# Patient Record
Sex: FEMALE | Race: WHITE | Employment: UNEMPLOYED | URBAN - METROPOLITAN AREA
[De-identification: names, ages, dates, MRNs, and addresses within clinical notes are randomized per-mention and may not be internally consistent; named-entity substitution may affect disease eponyms.]

---

## 2018-04-16 ENCOUNTER — HOSPITAL ENCOUNTER (OUTPATIENT)
Dept: MAMMOGRAPHY | Age: 48
Discharge: HOME | End: 2018-04-16
Attending: OBSTETRICS & GYNECOLOGY | Admitting: OBSTETRICS & GYNECOLOGY

## 2018-04-16 DIAGNOSIS — Z12.31 VISIT FOR SCREENING MAMMOGRAM: ICD-10-CM

## 2018-10-30 ENCOUNTER — HOSPITAL ENCOUNTER (OUTPATIENT)
Dept: PHYSICAL THERAPY | Age: 48
Setting detail: THERAPIES SERIES
Discharge: HOME | End: 2018-10-30
Payer: COMMERCIAL

## 2018-10-30 PROCEDURE — 97140 MANUAL THERAPY 1/> REGIONS: CPT

## 2018-10-30 PROCEDURE — G8982 BODY POS GOAL STATUS: HCPCS

## 2018-10-30 PROCEDURE — G8981 BODY POS CURRENT STATUS: HCPCS

## 2018-10-30 ASSESSMENT — PAIN SCALES - GENERAL: PAINLEVEL_OUTOF10: 2

## 2018-10-31 ENCOUNTER — HOSPITAL ENCOUNTER (OUTPATIENT)
Dept: PHYSICAL THERAPY | Age: 48
Setting detail: THERAPIES SERIES
Discharge: HOME | End: 2018-10-31
Payer: COMMERCIAL

## 2018-10-31 PROCEDURE — 97110 THERAPEUTIC EXERCISES: CPT

## 2018-10-31 PROCEDURE — 97140 MANUAL THERAPY 1/> REGIONS: CPT

## 2018-11-05 ENCOUNTER — HOSPITAL ENCOUNTER (OUTPATIENT)
Dept: PHYSICAL THERAPY | Age: 48
Setting detail: THERAPIES SERIES
Discharge: HOME | End: 2018-11-05
Payer: COMMERCIAL

## 2018-11-05 PROCEDURE — 97140 MANUAL THERAPY 1/> REGIONS: CPT

## 2018-11-05 PROCEDURE — 97110 THERAPEUTIC EXERCISES: CPT

## 2018-11-05 NOTE — FLOWSHEET NOTE
Physical Therapy Daily Treatment Note    Date:  2018    Patient Name:  Shlomo Angeles    :  1970  MRN: [id number]  Restrictions/Precautions:    Medical/Treatment Diagnosis Information:  · Diagnosis: LBP   Insurance/Certification information:  PT Insurance Information: [employer]  Physician Information:  Referring Practitioner: Chris  Plan of care signed (Y/N):  Y  Visit# / total visits:  3/16    G-Code (if applicable):         PT G-Codes  Functional Assessment Tool Used: Modified Oswestry  Score: 22% disability  Functional Limitation: Changing and maintaining body position  Changing and Maintaining Body Position Current Status (): At least 20 percent but less than 40 percent impaired, limited or restricted  Changing and Maintaining Body Position Goal Status (): At least 1 percent but less than 20 percent impaired, limited or restricted    Time in:   2:30     Timed Treatment: 45 Total Treatment Time:  45  ________________________________________________________________________________________    Pain Level:    0/10  SUBJECTIVE:  Pt reports increased mid back pain from sleeping on a different mattress. LBP and hip pain has improved. OBJECTIVE:     Exercise/Equipment Resistance/Repetitions Other comments          TA sets         With march       With KFO Until achieved with BP cuff  x10  x10 HEP video    Clamshells 10x5 secs B HEP video   Bridging 10x5 secs  HEP video          Prone hip extension   x5 mins  HEP video    Prone gluteal sets x2 mins  HEP video           Lumbo pelvic stabilization   x2 mins    Posterior sling supine x2 mins B    Anterior sling supine x2 mins B                  Posture re-ed   x6 mins            Hip neuro re-ed B IR/ER   x8 mins            Dowel wilber training   NV              Other Therapeutic Activities:  Movement training x5 mins    Manual Treatments:   Gr 5 LAD and SAD L hip. MWM L hip ER. Thoracic distraction gr 5 with cavitation.   Gr 4 lumbar

## 2018-11-07 ENCOUNTER — HOSPITAL ENCOUNTER (OUTPATIENT)
Dept: PHYSICAL THERAPY | Age: 48
Setting detail: THERAPIES SERIES
Discharge: HOME | End: 2018-11-07
Payer: COMMERCIAL

## 2018-11-07 PROCEDURE — 97110 THERAPEUTIC EXERCISES: CPT

## 2018-11-07 PROCEDURE — 97140 MANUAL THERAPY 1/> REGIONS: CPT

## 2018-11-07 NOTE — FLOWSHEET NOTE
Physical Therapy Daily Treatment Note    Date:  2018    Patient Name:  Som Siddiqi    :  1970  MRN: [id number]  Restrictions/Precautions:    Medical/Treatment Diagnosis Information:  · Diagnosis: LBP   Insurance/Certification information:  PT Insurance Information: [employer]  Physician Information:  Referring Practitioner: Chris  Plan of care signed (Y/N):  Y  Visit# / total visits:      G-Code (if applicable):         PT G-Codes  Functional Assessment Tool Used: Modified Oswestry  Score: 22% disability  Functional Limitation: Changing and maintaining body position  Changing and Maintaining Body Position Current Status (): At least 20 percent but less than 40 percent impaired, limited or restricted  Changing and Maintaining Body Position Goal Status (): At least 1 percent but less than 20 percent impaired, limited or restricted    Time in:   9:30     Timed Treatment: 45 Total Treatment Time:  45  ________________________________________________________________________________________    Pain Level:    0/10  SUBJECTIVE:  Pt reports feeling 'good today'. Pt did a lot of standing yesterday and was conscious of her posture. OBJECTIVE:     Exercise/Equipment Resistance/Repetitions Other comments          TA sets         With march       With KFO x2 mins  HEP video    Clamshells 10x5 secs B HEP video   Bridging 6x5 secs  HEP video          Prone hip extension    HEP video    Prone gluteal sets  HEP video           Lumbo pelvic stabilization   x2 mins    Posterior sling supine     Anterior sling supine     All 4 sit backs with dowel wilber  x15    Sidelying thoracic rotation   x12 B     Posture re-ed   x6 mins     Quadruped multifidus   x10 B     Hip neuro re-ed B IR/ER   x5 mins            Dowel wilber training   NV     TG   x6 mins end of session      Other Therapeutic Activities:  Movement training x5 mins    Manual Treatments:   Gr 5 LAD and SAD L hip. MWM L hip ER.   Thoracic distraction gr 5 with cavitation. Gr 4 lumbar gapping L L3-4.  elliott gr 5 L SI correction followed by prone SI correction gr 5 with cavitation. B LE pulling. Seated lumbar FMP raking and STM. L lumbar needing and manual stretching. Modalities:      Test/Measurements:    Hypomobility L SI and L hip joint   Increased tension L lumbar/SI with L rotation, SB and extension       ASSESSMENT:  Pt responding well to manual therapy with improved B hip and SI mobility. Movement impairment training to begin after normal biomechanics restored in L hip and core stabilization initiated.          Treatment/Activity Tolerance:   [x] Patient tolerated treatment well [] Patient limited by fatique  [] Patient limited by pain [] Patient limited by other medical complications  [] Other:     Goals:          Long term goals  Time Frame for Long term goals : 8 weeks  Long term goal 1: Pt independent wtih HEP  Long term goal 2: Pt hip strength to improve by 2/3 muscle grade grossly  Long term goal 3: Pt amb wtih normalized gait and absent ERS  Long term goal 4: Normal L SI joint mobility  Long term goal 5: Return to PLOF     Plan: [x] Continue per plan of care [] Alter current plan (see comments)   [] Plan of care initiated [] Hold pending MD visit [] Discharge      Plan for Next Session:      Re-Certification Due Date:         Signature:  Dell Amanda PT

## 2018-11-13 ENCOUNTER — HOSPITAL ENCOUNTER (OUTPATIENT)
Dept: PHYSICAL THERAPY | Age: 48
Setting detail: THERAPIES SERIES
Discharge: HOME | End: 2018-11-13
Payer: COMMERCIAL

## 2018-11-13 PROCEDURE — 97110 THERAPEUTIC EXERCISES: CPT

## 2018-11-13 PROCEDURE — 97140 MANUAL THERAPY 1/> REGIONS: CPT

## 2018-11-15 ENCOUNTER — HOSPITAL ENCOUNTER (OUTPATIENT)
Dept: PHYSICAL THERAPY | Age: 48
Setting detail: THERAPIES SERIES
Discharge: HOME | End: 2018-11-15
Payer: COMMERCIAL

## 2018-11-15 PROCEDURE — 97140 MANUAL THERAPY 1/> REGIONS: CPT

## 2018-11-15 NOTE — FLOWSHEET NOTE
Physical Therapy Daily Treatment Note    Date:  11/15/2018    Patient Name:  Anastasia Grey    :  1970  MRN: [id number]  Restrictions/Precautions:    Medical/Treatment Diagnosis Information:  · Diagnosis: LBP   Insurance/Certification information:  PT Insurance Information: [employer]  Physician Information:  Referring Practitioner: Chris  Plan of care signed (Y/N):  Y  Visit# / total visits:      G-Code (if applicable):         PT G-Codes  Functional Assessment Tool Used: Modified Oswestry  Score: 22% disability  Functional Limitation: Changing and maintaining body position  Changing and Maintaining Body Position Current Status (): At least 20 percent but less than 40 percent impaired, limited or restricted  Changing and Maintaining Body Position Goal Status (): At least 1 percent but less than 20 percent impaired, limited or restricted    Time in:   10:30    Timed Treatment: 30 Total Treatment Time:  30  ________________________________________________________________________________________    Pain Level:    0/10  SUBJECTIVE:    Pt reports feeling good after last session with decreased R SI pain and restrictions. Pt was able to do pilates with asymetrical work for her glutes.        OBJECTIVE:     Exercise/Equipment Resistance/Repetitions Other comments          TA sets         With march       With KFO  HEP video    Clamshells  HEP video   Bridging  HEP video          Prone hip extension    HEP video    Prone gluteal sets  HEP video    Stacking with MB       Lumbo pelvic stabilization sidelying  x4 mins    Posterior sling supine     Anterior sling supine     All 4 sit backs with dowel wilber      Sidelying thoracic rotation        Posture re-ed       Standing multifidus       Standing posterior sling     Hip neuro re-ed B IR/ER       Sidelying pelvic PNF   x10 with mod resist    Dowel wilber training   NV     TG         Other Therapeutic Activities:  Movement training x5 mins    Manual Treatments:   Gr 5 LAD and SAD B hip. MWM L hip ER. Thoracic distraction gr 5 with cavitation. Labette gr 5 R SI correction followed by prone SI correction gr 5 with cavitation. B LE pulling. Seated lumbar FMP raking and STM. Lumbar gapping mobs gr 3-4. L lumbar needing and manual stretching. Lumbar needing and STM R QL followed by CR stretching. SI drop technique without cavitation. Modalities:      Test/Measurements:    Full AROM lumbar spine but tightness and ache in mid LB at K8-6 region with certain movements  Hypomobility R AI and R hip joint   Hypomobility L2-4  Increased tension L lumbar/SI with L rotation, SB and extension  SB L with tension on the R       ASSESSMENT:  Pt responding well to manual therapy with improved B hip and SI mobility but again presented with R SI endrange lock. Pt states she was doing asymmetrical gluteal work with pilates yesterday so this training may have stressed the R SI. Movement impairment training to begin after normal biomechanics restored in L hip and core stabilization initiated.          Treatment/Activity Tolerance:   [x] Patient tolerated treatment well [] Patient limited by fatique  [] Patient limited by pain [] Patient limited by other medical complications  [] Other:     Goals:          Long term goals  Time Frame for Long term goals : 8 weeks  Long term goal 1: Pt independent wtih HEP  Long term goal 2: Pt hip strength to improve by 2/3 muscle grade grossly  Long term goal 3: Pt amb wtih normalized gait and absent ERS  Long term goal 4: Normal L SI joint mobility  Long term goal 5: Return to PLOF     Plan: [x] Continue per plan of care [] Alter current plan (see comments)   [] Plan of care initiated [] Hold pending MD visit [] Discharge      Plan for Next Session:      Re-Certification Due Date:         Signature:  Dimitri Sarmiento, PT

## 2018-11-19 ENCOUNTER — HOSPITAL ENCOUNTER (OUTPATIENT)
Dept: PHYSICAL THERAPY | Age: 48
Setting detail: THERAPIES SERIES
Discharge: HOME | End: 2018-11-19
Payer: COMMERCIAL

## 2018-11-19 PROCEDURE — 97140 MANUAL THERAPY 1/> REGIONS: CPT

## 2018-11-19 PROCEDURE — 97110 THERAPEUTIC EXERCISES: CPT

## 2018-11-19 NOTE — FLOWSHEET NOTE
Physical Therapy Daily Treatment Note    Date:  2018    Patient Name:  Germania Owens    :  1970  MRN: [id number]  Restrictions/Precautions:    Medical/Treatment Diagnosis Information:  · Diagnosis: LBP   Insurance/Certification information:  PT Insurance Information: [employer]iselda  Physician Information:  Referring Practitioner: Chris  Plan of care signed (Y/N):  Y  Visit# / total visits:      G-Code (if applicable):         PT G-Codes  Functional Assessment Tool Used: Modified Oswestry  Score: 22% disability  Functional Limitation: Changing and maintaining body position  Changing and Maintaining Body Position Current Status (): At least 20 percent but less than 40 percent impaired, limited or restricted  Changing and Maintaining Body Position Goal Status (): At least 1 percent but less than 20 percent impaired, limited or restricted    Time in:   2:30    Timed Treatment: 40 Total Treatment Time:  40  ________________________________________________________________________________________    Pain Level:    0/10  SUBJECTIVE:   Pt reports feeling good since last session.         OBJECTIVE:     Exercise/Equipment Resistance/Repetitions Other comments   SLS training   x6 mins B     TA sets         With march       With KFO  HEP video    Clamshells  HEP video   Magnetic click       Prone  To fatigue  To fatigue HEP video    Planks  x10 mins total all 3 HEP video   Quadruped superman   x10 with dowel wilber  HEP video    Prone hip extension    HEP video    Prone gluteal sets  HEP video    Stacking with MB       Lumbo pelvic stabilization sidelying  x4 mins    Posterior sling supine     Anterior sling supine     All 4 sit backs with dowel wilber      Sidelying thoracic rotation        Posture re-ed       Standing multifidus   x15 B maroon TB    Standing posterior sling x15 B 8\" step     Hip neuro re-ed B IR/ER       Sidelying pelvic PNF   x10 with mod resist    Dowel wilber training   NV     TG

## 2018-11-21 ENCOUNTER — HOSPITAL ENCOUNTER (OUTPATIENT)
Dept: PHYSICAL THERAPY | Age: 48
Setting detail: THERAPIES SERIES
Discharge: HOME | End: 2018-11-21
Payer: COMMERCIAL

## 2018-11-21 PROCEDURE — 97140 MANUAL THERAPY 1/> REGIONS: CPT

## 2018-11-27 ENCOUNTER — HOSPITAL ENCOUNTER (OUTPATIENT)
Dept: PHYSICAL THERAPY | Age: 48
Setting detail: THERAPIES SERIES
Discharge: HOME | End: 2018-11-27
Payer: COMMERCIAL

## 2018-11-29 ENCOUNTER — APPOINTMENT (OUTPATIENT)
Dept: PHYSICAL THERAPY | Age: 48
End: 2018-11-29
Payer: COMMERCIAL

## 2018-12-03 ENCOUNTER — HOSPITAL ENCOUNTER (OUTPATIENT)
Dept: PHYSICAL THERAPY | Age: 48
Setting detail: THERAPIES SERIES
Discharge: HOME | End: 2018-12-03
Payer: COMMERCIAL

## 2018-12-03 PROCEDURE — 97140 MANUAL THERAPY 1/> REGIONS: CPT

## 2018-12-06 ENCOUNTER — HOSPITAL ENCOUNTER (OUTPATIENT)
Dept: PHYSICAL THERAPY | Age: 48
Setting detail: THERAPIES SERIES
Discharge: HOME | End: 2018-12-06
Payer: COMMERCIAL

## 2018-12-06 PROCEDURE — 97110 THERAPEUTIC EXERCISES: CPT

## 2018-12-06 PROCEDURE — 97140 MANUAL THERAPY 1/> REGIONS: CPT

## 2018-12-06 NOTE — FLOWSHEET NOTE
Physical Therapy Daily Treatment Note    Date:  2018    Patient Name:  Mable Freeman    :  1970  MRN: [id number]  Restrictions/Precautions:    Medical/Treatment Diagnosis Information:  · Diagnosis: LBP   Insurance/Certification information:  PT Insurance Information: Griselda  Physician Information:  Referring Practitioner: Chris  Plan of care signed (Y/N):  Y  Visit# / total visits:  10/16    G-Code (if applicable):         PT G-Codes  Functional Assessment Tool Used: Modified Oswestry  Score: 22% disability  Functional Limitation: Changing and maintaining body position  Changing and Maintaining Body Position Current Status (): At least 20 percent but less than 40 percent impaired, limited or restricted  Changing and Maintaining Body Position Goal Status (): At least 1 percent but less than 20 percent impaired, limited or restricted    Time in:   10:00    Timed Treatment: 50 Total Treatment Time:  50  ________________________________________________________________________________________    Pain Level:    0/10  SUBJECTIVE:   Pt reports feeling good since last session. Pt notes the 'QL' is definitely the cause.            OBJECTIVE:     Exercise/Equipment Resistance/Repetitions Other comments   SLS training       TA sets         With march       With KFO 10x5 secs  HEP video    Bridging 10x5 secs  HEP video    Clamshells  HEP video   Magnetic click       Prone   HEP video    Planks   HEP video   Quadruped superman   x15 B HEP video    Prone hip extension    HEP video    Prone gluteal sets  HEP video    Stacking with MB       Lumbo pelvic stabilization supine x2 mins    Posterior sling supine x2 mins    Anterior sling supine     All 4 sit backs with dowel wilber  x15    Sidelying lumbo pelvic stab x3 mins total L    Posture re-ed       Standing multifidus       Standing posterior sling     Hip neuro re-ed IR/ER       Sidelying pelvic PNF   x10 with mod resist    Dowel wilber training Doorway squats x8 mins  x4 mins     TG   x6 mins with MHP end of session      Other Therapeutic Activities:  Movement training x5 mins    Manual Treatments:   Gr 5 LAD and SAD L hip. MWM L hip ER. L gr 5 PI correction with cavitation. Prone lumbar rocking mobs gr 3-4. Seated lumbar FMP raking and STM. L lumbar needing and manual stretching. Lumbar needing and STM L QL followed by CR stretching. Skin rolling L lateral lumbar followed by manual cross hand stretching. Modalities:      Test/Measurements:    Full AROM lumbar spine but tightness and ache in mid LB at U5-2 region with certain movements  L side lumbar with L rotation   R side lumbar with L SB  Hypomobility L PI   Hypermobility lower lumbar  L QL tenderness       ASSESSMENT:  Significant tension noted in L QL contributing to restricted movement on L side LB. Responded well to manual therapy with 0/10 pain by end of session. Initiated movement training with dowel wilber.           Treatment/Activity Tolerance:   [x] Patient tolerated treatment well [] Patient limited by fatique  [] Patient limited by pain [] Patient limited by other medical complications  [] Other:     Goals:          Long term goals  Time Frame for Long term goals : 8 weeks  Long term goal 1: Pt independent wtih HEP  Long term goal 2: Pt hip strength to improve by 2/3 muscle grade grossly  Long term goal 3: Pt amb wtih normalized gait and absent ERS  Long term goal 4: Normal L SI joint mobility  Long term goal 5: Return to PLOF     Plan: [x] Continue per plan of care [] Alter current plan (see comments)   [] Plan of care initiated [] Hold pending MD visit [] Discharge      Plan for Next Session:      Re-Certification Due Date:         Signature:  Keyla Conner, PT

## 2018-12-10 ENCOUNTER — HOSPITAL ENCOUNTER (OUTPATIENT)
Dept: PHYSICAL THERAPY | Age: 48
Setting detail: THERAPIES SERIES
Discharge: HOME | End: 2018-12-10
Payer: COMMERCIAL

## 2018-12-10 PROCEDURE — 97110 THERAPEUTIC EXERCISES: CPT

## 2018-12-10 PROCEDURE — 97140 MANUAL THERAPY 1/> REGIONS: CPT

## 2018-12-12 ENCOUNTER — HOSPITAL ENCOUNTER (OUTPATIENT)
Dept: PHYSICAL THERAPY | Age: 48
Setting detail: THERAPIES SERIES
Discharge: HOME | End: 2018-12-12
Payer: COMMERCIAL

## 2018-12-12 PROCEDURE — 97140 MANUAL THERAPY 1/> REGIONS: CPT

## 2018-12-12 PROCEDURE — 97110 THERAPEUTIC EXERCISES: CPT

## 2018-12-12 NOTE — FLOWSHEET NOTE
Standing posterior sling     Hip neuro re-ed IR/ER       Sidelying pelvic PNF   x10 with mod resist    Dowel wilber training         Doorway squats   x4 mins     TG   x6 mins with MHP end of session      Other Therapeutic Activities:  Movement training x5 mins    Manual Treatments:     L lumbar needing and manual stretching. Lumbar needing and STM L QL followed by CR stretching. Modalities:      Test/Measurements:      L QL tenderness  L thoracic rotation restriction       ASSESSMENT:  Significant tension noted in L QL contributing to restricted movement on L side LB. Responded well to manual therapy with 0/10 pain by end of session.             Treatment/Activity Tolerance:   [x] Patient tolerated treatment well [] Patient limited by fatique  [] Patient limited by pain [] Patient limited by other medical complications  [] Other:     Goals:          Long term goals  Time Frame for Long term goals : 8 weeks  Long term goal 1: Pt independent wtih HEP  Long term goal 2: Pt hip strength to improve by 2/3 muscle grade grossly  Long term goal 3: Pt amb wtih normalized gait and absent ERS  Long term goal 4: Normal L SI joint mobility  Long term goal 5: Return to PLOF     Plan: [x] Continue per plan of care [] Alter current plan (see comments)   [] Plan of care initiated [] Hold pending MD visit [] Discharge      Plan for Next Session:      Re-Certification Due Date:         Signature:  Pastor Diaz, PT

## 2018-12-19 ENCOUNTER — APPOINTMENT (OUTPATIENT)
Dept: PHYSICAL THERAPY | Age: 48
End: 2018-12-19
Payer: COMMERCIAL

## 2018-12-21 ENCOUNTER — HOSPITAL ENCOUNTER (OUTPATIENT)
Dept: PHYSICAL THERAPY | Age: 48
Setting detail: THERAPIES SERIES
Discharge: HOME | End: 2018-12-21
Payer: COMMERCIAL

## 2018-12-21 PROCEDURE — 97140 MANUAL THERAPY 1/> REGIONS: CPT

## 2018-12-21 PROCEDURE — 97110 THERAPEUTIC EXERCISES: CPT

## 2018-12-24 ENCOUNTER — APPOINTMENT (OUTPATIENT)
Dept: PHYSICAL THERAPY | Age: 48
End: 2018-12-24
Payer: COMMERCIAL

## 2019-01-03 ENCOUNTER — HOSPITAL ENCOUNTER (OUTPATIENT)
Dept: PHYSICAL THERAPY | Age: 49
Setting detail: THERAPIES SERIES
Discharge: HOME | End: 2019-01-03
Payer: COMMERCIAL

## 2019-01-03 PROCEDURE — 97530 THERAPEUTIC ACTIVITIES: CPT

## 2019-01-07 ENCOUNTER — APPOINTMENT (OUTPATIENT)
Dept: PHYSICAL THERAPY | Age: 49
End: 2019-01-07
Payer: COMMERCIAL

## 2019-01-10 ENCOUNTER — APPOINTMENT (OUTPATIENT)
Dept: PHYSICAL THERAPY | Age: 49
End: 2019-01-10
Payer: COMMERCIAL

## 2019-01-24 ENCOUNTER — APPOINTMENT (OUTPATIENT)
Dept: PHYSICAL THERAPY | Age: 49
End: 2019-01-24
Payer: COMMERCIAL

## 2019-02-07 ENCOUNTER — HOSPITAL ENCOUNTER (OUTPATIENT)
Dept: PHYSICAL THERAPY | Age: 49
Setting detail: THERAPIES SERIES
Discharge: HOME | End: 2019-02-07
Payer: COMMERCIAL

## 2019-02-07 PROCEDURE — 97140 MANUAL THERAPY 1/> REGIONS: CPT

## 2019-05-21 ENCOUNTER — HOSPITAL ENCOUNTER (OUTPATIENT)
Dept: MAMMOGRAPHY | Age: 49
Discharge: HOME | End: 2019-05-21
Payer: COMMERCIAL

## 2019-05-21 DIAGNOSIS — Z12.31 VISIT FOR SCREENING MAMMOGRAM: ICD-10-CM

## 2019-05-21 PROCEDURE — 77067 SCR MAMMO BI INCL CAD: CPT

## 2021-04-06 ENCOUNTER — HOSPITAL ENCOUNTER (OUTPATIENT)
Dept: MAMMOGRAPHY | Age: 51
Discharge: HOME | End: 2021-04-06
Payer: COMMERCIAL

## 2021-04-06 DIAGNOSIS — Z12.31 VISIT FOR SCREENING MAMMOGRAM: ICD-10-CM

## 2021-04-06 PROCEDURE — 77063 BREAST TOMOSYNTHESIS BI: CPT

## 2022-05-24 ENCOUNTER — HOSPITAL ENCOUNTER (OUTPATIENT)
Dept: MAMMOGRAPHY | Age: 52
Discharge: HOME | End: 2022-05-24
Payer: COMMERCIAL

## 2022-05-24 VITALS — HEIGHT: 65 IN | BODY MASS INDEX: 22.99 KG/M2 | WEIGHT: 138 LBS

## 2022-05-24 DIAGNOSIS — Z12.31 VISIT FOR SCREENING MAMMOGRAM: ICD-10-CM

## 2022-05-24 PROCEDURE — 77063 BREAST TOMOSYNTHESIS BI: CPT

## 2024-02-26 ENCOUNTER — HOSPITAL ENCOUNTER (OUTPATIENT)
Dept: MAMMOGRAPHY | Age: 54
Discharge: HOME | End: 2024-02-26
Payer: COMMERCIAL

## 2024-02-26 VITALS — BODY MASS INDEX: 22.99 KG/M2 | HEIGHT: 65 IN | WEIGHT: 138 LBS

## 2024-02-26 DIAGNOSIS — Z12.31 VISIT FOR SCREENING MAMMOGRAM: ICD-10-CM

## 2024-02-26 PROCEDURE — 77063 BREAST TOMOSYNTHESIS BI: CPT

## 2025-04-24 ENCOUNTER — HOSPITAL ENCOUNTER (OUTPATIENT)
Dept: MAMMOGRAPHY | Age: 55
Discharge: HOME | End: 2025-04-24
Payer: COMMERCIAL

## 2025-04-24 DIAGNOSIS — Z12.31 VISIT FOR SCREENING MAMMOGRAM: ICD-10-CM

## 2025-04-24 PROCEDURE — 77063 BREAST TOMOSYNTHESIS BI: CPT
